# Patient Record
Sex: FEMALE | Race: WHITE | Employment: UNEMPLOYED | URBAN - NONMETROPOLITAN AREA
[De-identification: names, ages, dates, MRNs, and addresses within clinical notes are randomized per-mention and may not be internally consistent; named-entity substitution may affect disease eponyms.]

---

## 2018-03-22 ENCOUNTER — TELEPHONE (OUTPATIENT)
Dept: OTHER | Age: 83
End: 2018-03-22

## 2018-07-24 ENCOUNTER — HOSPITAL ENCOUNTER (OUTPATIENT)
Age: 83
Setting detail: SPECIMEN
Discharge: HOME | End: 2018-07-24
Payer: MEDICARE

## 2018-07-25 LAB
CULTURE: NORMAL
Lab: NORMAL
SPECIMEN DESCRIPTION: NORMAL
STATUS: NORMAL

## 2018-11-13 ENCOUNTER — ANESTHESIA (OUTPATIENT)
Dept: ENDOSCOPY | Age: 83
End: 2018-11-13
Payer: MEDICARE

## 2018-11-13 ENCOUNTER — ANESTHESIA EVENT (OUTPATIENT)
Dept: ENDOSCOPY | Age: 83
End: 2018-11-13
Payer: MEDICARE

## 2018-11-13 ENCOUNTER — HOSPITAL ENCOUNTER (OUTPATIENT)
Age: 83
Setting detail: OUTPATIENT SURGERY
Discharge: HOME | End: 2018-11-13
Attending: INTERNAL MEDICINE | Admitting: INTERNAL MEDICINE
Payer: MEDICARE

## 2018-11-13 VITALS
SYSTOLIC BLOOD PRESSURE: 164 MMHG | OXYGEN SATURATION: 98 % | RESPIRATION RATE: 28 BRPM | DIASTOLIC BLOOD PRESSURE: 74 MMHG

## 2018-11-13 VITALS
HEART RATE: 91 BPM | SYSTOLIC BLOOD PRESSURE: 144 MMHG | OXYGEN SATURATION: 99 % | BODY MASS INDEX: 27.31 KG/M2 | TEMPERATURE: 97.3 F | WEIGHT: 174 LBS | DIASTOLIC BLOOD PRESSURE: 49 MMHG | HEIGHT: 67 IN | RESPIRATION RATE: 17 BRPM

## 2018-11-13 LAB — GLUCOSE BLD-MCNC: 102 MG/DL (ref 65–105)

## 2018-11-13 PROCEDURE — 6360000002 HC RX W HCPCS: Performed by: NURSE ANESTHETIST, CERTIFIED REGISTERED

## 2018-11-13 PROCEDURE — 6360000002 HC RX W HCPCS: Performed by: ANESTHESIOLOGY

## 2018-11-13 PROCEDURE — 2580000003 HC RX 258: Performed by: INTERNAL MEDICINE

## 2018-11-13 PROCEDURE — 88305 TISSUE EXAM BY PATHOLOGIST: CPT

## 2018-11-13 PROCEDURE — C1769 GUIDE WIRE: HCPCS | Performed by: INTERNAL MEDICINE

## 2018-11-13 PROCEDURE — 7100000011 HC PHASE II RECOVERY - ADDTL 15 MIN: Performed by: INTERNAL MEDICINE

## 2018-11-13 PROCEDURE — 2500000003 HC RX 250 WO HCPCS: Performed by: NURSE ANESTHETIST, CERTIFIED REGISTERED

## 2018-11-13 PROCEDURE — 3700000000 HC ANESTHESIA ATTENDED CARE: Performed by: INTERNAL MEDICINE

## 2018-11-13 PROCEDURE — 3609012400 HC EGD TRANSORAL BIOPSY SINGLE/MULTIPLE: Performed by: INTERNAL MEDICINE

## 2018-11-13 PROCEDURE — 3609012700 HC EGD DILATION SAVORY: Performed by: INTERNAL MEDICINE

## 2018-11-13 PROCEDURE — 82947 ASSAY GLUCOSE BLOOD QUANT: CPT

## 2018-11-13 PROCEDURE — 7100000010 HC PHASE II RECOVERY - FIRST 15 MIN: Performed by: INTERNAL MEDICINE

## 2018-11-13 PROCEDURE — 3700000001 HC ADD 15 MINUTES (ANESTHESIA): Performed by: INTERNAL MEDICINE

## 2018-11-13 PROCEDURE — 2709999900 HC NON-CHARGEABLE SUPPLY: Performed by: INTERNAL MEDICINE

## 2018-11-13 RX ORDER — ONDANSETRON 2 MG/ML
4 INJECTION INTRAMUSCULAR; INTRAVENOUS
Status: COMPLETED | OUTPATIENT
Start: 2018-11-13 | End: 2018-11-13

## 2018-11-13 RX ORDER — PROPOFOL 10 MG/ML
INJECTION, EMULSION INTRAVENOUS PRN
Status: DISCONTINUED | OUTPATIENT
Start: 2018-11-13 | End: 2018-11-13 | Stop reason: SDUPTHER

## 2018-11-13 RX ORDER — SODIUM CHLORIDE 9 MG/ML
INJECTION, SOLUTION INTRAVENOUS CONTINUOUS
Status: DISCONTINUED | OUTPATIENT
Start: 2018-11-13 | End: 2018-11-13 | Stop reason: HOSPADM

## 2018-11-13 RX ORDER — GLYCOPYRROLATE 1 MG/5 ML
SYRINGE (ML) INTRAVENOUS PRN
Status: DISCONTINUED | OUTPATIENT
Start: 2018-11-13 | End: 2018-11-13 | Stop reason: SDUPTHER

## 2018-11-13 RX ORDER — BUPRENORPHINE 15 UG/H
1 PATCH TRANSDERMAL WEEKLY
COMMUNITY

## 2018-11-13 RX ADMIN — PROPOFOL 20 MG: 10 INJECTION, EMULSION INTRAVENOUS at 09:16

## 2018-11-13 RX ADMIN — PROPOFOL 30 MG: 10 INJECTION, EMULSION INTRAVENOUS at 09:12

## 2018-11-13 RX ADMIN — SODIUM CHLORIDE: 9 INJECTION, SOLUTION INTRAVENOUS at 08:55

## 2018-11-13 RX ADMIN — PROPOFOL 20 MG: 10 INJECTION, EMULSION INTRAVENOUS at 09:11

## 2018-11-13 RX ADMIN — ONDANSETRON 4 MG: 2 INJECTION INTRAMUSCULAR; INTRAVENOUS at 08:55

## 2018-11-13 RX ADMIN — PROPOFOL 30 MG: 10 INJECTION, EMULSION INTRAVENOUS at 09:09

## 2018-11-13 RX ADMIN — Medication 0.2 MG: at 09:07

## 2018-11-13 RX ADMIN — PROPOFOL 20 MG: 10 INJECTION, EMULSION INTRAVENOUS at 09:10

## 2018-11-13 ASSESSMENT — ENCOUNTER SYMPTOMS
SHORTNESS OF BREATH: 0
STRIDOR: 0

## 2018-11-13 ASSESSMENT — PAIN - FUNCTIONAL ASSESSMENT: PAIN_FUNCTIONAL_ASSESSMENT: 0-10

## 2018-11-13 ASSESSMENT — PAIN SCALES - GENERAL
PAINLEVEL_OUTOF10: 0
PAINLEVEL_OUTOF10: 0

## 2018-11-13 NOTE — H&P
History and Physical    Pt Name: Edward Grande  MRN: [id number]  YOB: 1930  Date of evaluation: 11/13/2018  Primary Care Physician: Elias Barbosa PA-C    SUBJECTIVE:   History of Chief Complaint:    Edward Grande is a 80 y.o. female who is scheduled for EGD. She is present with her son and daughter in law who help with history- pt is alert and oriented but somewhat [place]. She reports she is \"nauseous all the time\" and her son says this has been an ongoing issue for a \"long time, years\". Patient also reports sensation of food getting stuck at times. Family says she has had prior EGD done but unsure when. Allergies  is allergic to latex; adhesive tape; alcohol; aspirin; dronabinol; ether; ibuprofen; levofloxacin; pcn [penicillins]; penicillin g; and sulfa antibiotics. Medications  Prior to Admission medications    Medication Sig Start Date End Date Taking?  Authorizing Provider   polyethylene glycol (GLYCOLAX) packet Take 17 g by mouth 5/8/16   Historical Provider, MD   Multiple Vitamins-Minerals (MULTIVITAMIN ADULT PO) Take 1 capsule by mouth    Historical Provider, MD   Flaxseed, Linseed, (FLAXSEED OIL PO) Take 1,000 mg by mouth 5/8/16   Historical Provider, MD   vitamin D (CHOLECALCIFEROL) 1000 UNITS TABS tablet Take 1,000 Units by mouth 5/8/16   Historical Provider, MD [place] Zingiber officinalis, (DEYANIRA ROOT) 550 MG CAPS Take 550 mg by mouth daily    Historical Provider, MD   Handicap Placard MISC by Does not apply route Dx oa,exp 5 years from this date 5/23/16   Maria G Gray PA-C   propranolol (INDERAL) 20 MG tablet TAKE ONE TABLET BY MOUTH TWICE A DAY 2/26/16   Fartun Gray PA-C   alendronate (FOSAMAX) 70 MG tablet Take 1 tablet by mouth every 7 days 2/10/16   Maria G Gray PA-C   meclizine (ANTIVERT) 25 MG tablet Take 1 tablet by mouth 2 times daily Indications: 1 q 12 hrs prn 2/10/16   Fartun Gray PA-C   dicyclomine (BENTYL) 20 MG tablet Take 1 tablet by mouth every 6 hours

## 2018-11-13 NOTE — ANESTHESIA PRE PROCEDURE
Former Smoker    Smokeless tobacco: Never Used    Alcohol use No                                Counseling given: Not Answered      Vital Signs (Current):   Vitals:    11/13/18 0818   BP: (!) 146/76   Pulse: 70   Resp: 21   Temp: 98.1 °F (36.7 °C)   TempSrc: Infrared   SpO2: 97%   Weight: 174 lb (78.9 kg)   Height: 5' 7\" (1.702 m)                                              BP Readings from Last 3 Encounters:   11/13/18 (!) 146/76   06/15/16 106/68   05/19/16 130/80       NPO Status: Time of last liquid consumption: 1910                        Time of last solid consumption: 1900                        Date of last liquid consumption: 11/12/18                        Date of last solid food consumption: 11/12/18    BMI:   Wt Readings from Last 3 Encounters:   11/13/18 174 lb (78.9 kg)   06/15/16 179 lb 4.8 oz (81.3 kg)   05/19/16 178 lb (80.7 kg)     Body mass index is 27.25 kg/m².     CBC:   Lab Results   Component Value Date    WBC 6.5 05/31/2016    RBC 3.98 05/31/2016    HGB 11.0 05/31/2016    HCT 33.4 05/31/2016    MCV 84 05/31/2016     05/31/2016       CMP:   Lab Results   Component Value Date    PROT 6.8 05/31/2016    CALCIUM 9.1 05/31/2016    BILITOT 0.4 05/31/2016    ALKPHOS 107 05/31/2016    AST 20 05/31/2016    ALT 12 05/31/2016       POC Tests:   Recent Labs      11/13/18   0824   POCGLU  102       Coags: No results found for: PROTIME, INR, APTT    HCG (If Applicable): No results found for: PREGTESTUR, PREGSERUM, HCG, HCGQUANT     ABGs: No results found for: PHART, PO2ART, LLV1XRC, WYY1XUO, BEART, D8ZMXTHK     Type & Screen (If Applicable):  No results found for: LABABO, LABRH    Anesthesia Evaluation   no history of anesthetic complications:   Airway: Mallampati: I     Neck ROM: full  Mouth opening: > = 3 FB Dental:    (+) partials      Pulmonary:       (-) COPD, asthma, shortness of breath, sleep apnea and stridor                           Cardiovascular:    (+) hypertension:, valvular problems/murmurs: MR, dysrhythmias: atrial fibrillation, pulmonary hypertension:, hyperlipidemia    (-) pacemaker, CABG/stent,  angina and  CHF        Rate: normal                    Neuro/Psych:   (+) depression/anxiety    (-) seizures, TIA and CVA            ROS comment: [place] GI/Hepatic/Renal:   (+) GERD:,      (-) liver disease and no renal disease       Endo/Other:    (+) DiabetesType II DM, , hypothyroidism, blood dyscrasia::., .    (-) hyperthyroidism               Abdominal:       Abdomen: soft. Vascular:                                        Anesthesia Plan      MAC     ASA 3       Induction: intravenous. Anesthetic plan and risks discussed with patient, legal guardian and child/children.                       Nory Kimball MD   11/13/2018

## 2018-11-14 LAB — SURGICAL PATHOLOGY REPORT: NORMAL

## 2020-04-29 ENCOUNTER — HOSPITAL ENCOUNTER (OUTPATIENT)
Age: 85
Setting detail: SPECIMEN
Discharge: HOME | End: 2020-04-29
Payer: MEDICARE

## 2020-05-01 LAB
CULTURE: NORMAL
Lab: NORMAL
SPECIMEN DESCRIPTION: NORMAL

## (undated) DEVICE — SAVARY-GILLIARD WIRE GUIDE: Brand: SAVARY-GILLIARD

## (undated) DEVICE — FORCEPS BX L240CM WRK CHN 2.8MM STD CAP W/ NDL MIC MESH